# Patient Record
(demographics unavailable — no encounter records)

---

## 2025-04-02 NOTE — PHYSICAL EXAM
[TextEntry] : PHYSICAL EXAM  General: The patient was alert, oriented and in no distress. Voice was clear.  Face: The patient had no facial asymmetry or mass. The skin was unremarkable.  Ears: Hearing normal to conversational voice External ears were normal without deformity. Ear canals: Cerumen  Nose:  The external nose had no significant deformity. On anterior rhinoscopy, the nasal mucosa was clear.  The anterior septum was grossly midline. There were no visualized polyps, purulence  or masses.  Marathon maneuver is positive for nasal valve collapse  Oral cavity: Oral mucosa- normal. Oral and base of tongue- clear and without mass. Gingival and buccal mucosa- moist and without lesions. Palate- the palate moved well. There was no cleft palate. There appeared to be good salivary flow.   Oral cavity/oropharynx- no pus, erythema or mass  Neck:  The neck was symmetrical. The parotid and submandibular glands were normal without masses. The trachea was midline and there was no unusual crepitus. Thyroid was smooth and nontender and no masses were palpated. No masses  Lymphatics: Cervical adenopathy- none.    PROCEDURE:  FLEXIBLE LARYNGOSCOPY, NASAL ENDOSCOPY   Surgeon: Dr. Witt Indication: Chronic rhinitis, assess for deviated septum, inadequate exam on anterior rhinoscopy Anesthetic: Topical lidocaine and Afrin Procedure: The patient was placed in a sitting position.  Following application of the topical anesthetic and decongestant, exam was performed with a flexible telescope.  The scope was passed along the right nasal floor to the nasopharynx.  It was then passed into the region of the middle meatus, middle turbinate, and sphenoethmoid region.  An identical procedure was performed on the left side.  The following findings were noted:  Nasal mucosa: Mild nasal mucosal edema to Septum: Deviated nasal [id number] curving from right to left Narrow nasal passages  Right nasal cavity      Inferior turbinate: Hypertrophic      Middle turbinate: normal      Superior turbinate: normal      Inferior meatus: no pus, polyps or congestion      Middle meatus:  no pus, polyps or congestion       Superior meatus:  no pus, polyps, or congestion      Sphenoethmoidal recess: no pus, polyps or congestion   Left nasal cavity      Inferior turbinate: Hypertrophic      Middle turbinate: normal      Superior turbinate: normal      Inferior meatus: no pus, polyps or congestion      Middle meatus: no pus, polyps, or congestion      Superior meatus:  no pus, polyps, or congestion      Sphenoethmoidal recess: no pus, polyps or congestion   Nasopharynx: no masses, choanae patent, no adenoid tissue  Base of tongue and vallecula: no masses or asymmetry Posterior pharyngeal wall: no masses.  Hypopharynx: symmetrical. No masses Pyriform sinuses: no lesions or pooling of secretions Epiglottis: normal. No edema or lesions Aryepiglottic folds: normal. No lesions.  True vocal cords: clear and mobile. No lesions. Airway patent False vocal cords: normal Ventricles: no masses.  Arytenoids: Normal Interarytenoid area: no masses.  Normal Subglottis: normal. no masses

## 2025-04-02 NOTE — REVIEW OF SYSTEMS
[Patient Intake Form Reviewed] : Patient intake form was reviewed Previous Accession (Optional): [id number] Previous Accession (Optional): [id number]

## 2025-04-02 NOTE — ASSESSMENT
[FreeTextEntry1] : She has a long history of chronic rhinitis with constant bilateral nasal congestion and obstruction.  It is worse on the right side.  She also has a history of nonallergic rhinitis with postnasal drip and nasal drainage.  Those symptoms are worse with the change in weather.  On exam, she has narrow nasal passages with a deviated nasal septum, inferior turbinate hypertrophy, and nasal valve collapse  She also has reflux which contributes to the postnasal drip  Plan -Findings and management options were discussed with the patient. - Reflux precautions recommended.  She was given literature.  OTC medication as needed - Trial of nasal saline irrigations and Breathe Right strips - Trial of Flonase ipratropium nasal spray - We discussed possible nasal procedures including septoplasty, inferior turbinate reduction, nasal valve repair/remodeling, and vidian nerve radiofrequency or cryoablation.  I will see how she responds to the medications and the Breathe Right strips.  We will discuss this further when she returns - Follow-up in approximately 2 to 3 weeks - Call and return earlier if any problems or worsening symptoms

## 2025-04-02 NOTE — HISTORY OF PRESENT ILLNESS
[de-identified] : JORDAN PARSONS is a 31 year old patient Here for evaluation for a deviated septum.  She has a long history of chronic nasal congestion and obstruction.  She also suffers from postnasal drip and nasal drainage.  The nasal drainage and postnasal drip are worse with the change in seasons.  She was diagnosed with nonallergic rhinitis in the past.  She has seen ENT in the past including Dr. Rowland a few years ago.  She said allergy testing was negative No history of recurrent sinusitis No history of nasal trauma or nasal/sinus surgery She has a dog at home but is not allergic to dogs She has reflux.  She sees a GI and takes medication as needed She is not try nasal saline rinses or Breathe Right strips.  She has tried a nasal steroid spray which helped.  She tried an antihistamine which did not help. She does not smoke or vape tobacco or marijuana

## 2025-04-28 NOTE — PHYSICAL EXAM
[TextEntry] : General: The patient was alert, oriented and in no distress. Voice was clear.  Face: The patient had no facial asymmetry or mass. The skin was unremarkable.  Ears: Hearing normal to conversational voice External ears were normal without deformity. Ear canals- cerumen impaction  PROCEDURE- EAR MICROSCOPY AND CERUMEN REMOVAL  Indication: cerumen removal  Under the microscope, obstructing cerumen was removed atraumatically from the both ears with a suction, curette and/or forceps.  Canals: normal. no inflammation.  Tympanic membranes: Intact. no perforation or effusion.  Nose: The external nose had no significant deformity. On anterior rhinoscopy, the nasal mucosa was clear. The anterior septum was grossly midline. There were no visualized polyps, purulence or masses. She has nasal valve collapse  Oral cavity: Oral mucosa- normal. Oral and base of tongue- clear and without mass. Gingival and buccal mucosa- moist and without lesions. Palate- the palate moved well. There was no cleft palate. There appeared to be good salivary flow. Oral cavity/oropharynx- no pus, erythema or mass  Neck: The neck was symmetrical. The parotid and submandibular glands were normal without masses. The trachea was midline and there was no unusual crepitus. Thyroid was smooth and nontender and no masses were palpated. No masses  Lymphatics: Cervical adenopathy- none.

## 2025-04-28 NOTE — HISTORY OF PRESENT ILLNESS
[de-identified] : - 4/2/25-  JORDAN PARSONS is a 31 year old patient Here for evaluation for a deviated septum. She has a long history of chronic nasal congestion and obstruction. She also suffers from postnasal drip and nasal drainage. The nasal drainage and postnasal drip are worse with the change in seasons. She was diagnosed with nonallergic rhinitis in the past. She has seen ENT in the past including Dr. Rivera a few years ago.  She said allergy testing was negative No history of recurrent sinusitis No history of nasal trauma or nasal/sinus surgery She has a dog at home but is not allergic to dogs She has reflux. She sees a GI and takes medication as needed She is not try nasal saline rinses or Breathe Right strips. She has tried a nasal steroid spray which helped. She tried an antihistamine which did not help. She does not smoke or vape tobacco or marijuana  Exam- nasal valve collapse, DNS, ITH [FreeTextEntry1] : - 4/28/25- She is here for follow-up for nasal congestion, nasal obstruction nasal drainage and postnasal drip.  She tried the ipratropium nasal spray but it did not help.  The Breathe Right strips helped a lot.  She started the Flonase about 2 days ago.  She is not sure if it is helping.

## 2025-04-28 NOTE — ASSESSMENT
[FreeTextEntry1] : She has a history of chronic rhinitis with nasal congestion, nasal obstruction, nasal drainage, postnasal drip.  The congestion and obstruction is worse on the right side.  She has a deviated septum, inferior turbinate hypertrophy, and nasal valve collapse.  She had significant improvement with the Breathe Right strips.  Ipratropium nasal spray did not help.  She is using Flonase but has not noticed a change yet  Cerumen impaction was removed bilaterally  Plan -Findings and management options were discussed with the patient. - Continue Flonase for 2 weeks - Nasal saline irrigations and Breathe Right strips as needed - Continue reflux precautions and medication as needed - She is a candidate for septoplasty, inferior turbinate reduction, and nasal valve repair/remodeling.  I would hold off on vidian nerve radiofrequency or cryoablation since ipratropium nasal spray did not help.  I am going to have her see one of my colleagues who is a fellowship trained facial plastic surgeon for evaluation of nasal valve repair.  We will see if she would like to undergo nasal valve repair in the operating room along with septoplasty and bilateral inferior turbinate submucous resection.  If she opts against nasal valve repair in the operating room, she may consider nasal valve remodeling in the office. - I will see her back or speak with her after the evaluation.  She should call and return earlier if any problems

## 2025-04-29 NOTE — HISTORY OF PRESENT ILLNESS
[de-identified] : Ms. JORDAN PARSONS is a pleasant 31 year female presenting today as referral from Dr Witt for nasal obstruction.   Pt reports h/o long-standing nasal obstruction, L>R. Symptoms have been present for their whole life. They have tried nasal steroid sprays in the past for 4 consecutive weeks which help minimally. They have tried nasal dilator strips/nasal cones which help a lot. They endorse h/o allergy like symptoms; has been allergy tested which was negative but takes OTC antihistamines. Denies h/o sinus infections. Denies h/o nasal trauma. Denies h/o nasal surgery. Pt was evaluated by Dr Witt and nasal endoscopy was completed at that time revealing septal deviation. Aesthetically, no concerns.   Past medical/surgical history includes wisdom teeth extraction, GERD. Nonsmoker. Not on anticoagulation. Here by herself.

## 2025-04-29 NOTE — ASSESSMENT
[FreeTextEntry1] : .  Plan: - Patient has significant nasal obstruction refractory to maximal medical therapy and structural anatomic abnormalities that would benefit from nasal airway surgery. This would comprise septoplasty via endonasal approach, nasal valve repair (bilateral  grafts), and bilateral turbinate reduction. We did also have a long discussion of the possibility of omitting the nasal valve repair component and we discussed that she may run the risk of having some persistent nasal congestion as a result, though she should still get some benefit from the septoplasty & turbinate reduction portions. We discussed that in that situation, it is possible to address the nasal valve as a secondary procedure though this may be a bit more complex due to need for extraseptal cartilage donor site and scarring. - Discussed in detail the benefits, alternatives, and risks of this procedure which include, but are not limited to, infection, bleeding, scarring, change in appearance, septal perforation, continued nasal obstruction, and need for revision surgery. The patient reports understanding of these risks, the proposed benefits, and alternatives and will think about how she wishes to proceed.  -- Delilah Cabrera MD Facial Plastic & Reconstructive Surgery

## 2025-04-29 NOTE — PHYSICAL EXAM
[TextEntry] : GEN: well nourished, well developed, no acute distress. HEAD: normocephalic, atraumatic, no TMJ pain or jaw clicking FACE: symmetric and motion intact, Melara 2  EYES: EOMI, pupils symmetric, normal conjunctiva, vision grossly intact EARS: bilateral auricles normal, TMs intact & well-aerated, hearing grossly intact EXT NOSE:  moderately thin skin, narrow midvault with straight and midline dorsum, no external deformity  INT NOSE: Mucosa healthy, severe rightward septum deviation with near complete occlusion of right nasal cavity and leftward septal spur, narrow internal nasal valve w/ moderate improvement on Allan maneuver, moderate turbinate hypertrophy SKIN: intact, warm, and dry NEURO: alert & oriented x4